# Patient Record
Sex: MALE | Race: WHITE
[De-identification: names, ages, dates, MRNs, and addresses within clinical notes are randomized per-mention and may not be internally consistent; named-entity substitution may affect disease eponyms.]

---

## 2019-02-26 ENCOUNTER — HOSPITAL ENCOUNTER (EMERGENCY)
Dept: HOSPITAL 97 - ER | Age: 29
Discharge: HOME | End: 2019-02-26
Payer: SELF-PAY

## 2019-02-26 DIAGNOSIS — Z91.018: ICD-10-CM

## 2019-02-26 DIAGNOSIS — Z72.0: ICD-10-CM

## 2019-02-26 DIAGNOSIS — R04.0: Primary | ICD-10-CM

## 2019-02-26 PROCEDURE — 99282 EMERGENCY DEPT VISIT SF MDM: CPT

## 2019-02-26 NOTE — ER
Nurse's Notes                                                                                     

 [facility]                                                                

Name: Haresh Britt                                                                         

Age: 29 yrs                                                                                       

Sex: Male                                                                                         

: 1990                                                                                   

MRN: [id number]                                                                                   

Arrival Date: 2019                                                                          

Time: 13:47                                                                                       

Account#: [id number]                                                                            

Bed 5                                                                                             

Private MD: None, None                                                                            

Diagnosis: Epistaxis                                                                              

                                                                                                  

Presentation:                                                                                     

                                                                                             

13:56 Presenting complaint: Patient states: Frequent nose bleeds x 2 -3 days, reports recent  ph  

      cold-like symptoms, c/o intermittent bleeding from R nare lasting approx 5-6 min, no        

      bleeding noted at this time. Transition of care: patient was not received from another      

      setting of care. Onset of symptoms was 2019. Risk Assessment: Do you want      

      to hurt yourself or someone else? Patient reports no desire to harm self or others.         

      Initial Sepsis Screen: Does the patient meet any 2 criteria? No. Patient's initial          

      sepsis screen is negative. Does the patient have a suspected source of infection? No.       

      Patient's initial sepsis screen is negative. Care prior to arrival: None.                   

13:56 Method Of Arrival: Ambulatory                                                           ph  

13:56 Acuity: ALEJANDRA 4                                                                           ph  

                                                                                                  

Historical:                                                                                       

- Allergies:                                                                                      

13:58 ORANGES;                                                                                ph  

- Home Meds:                                                                                      

13:58 None [Active];                                                                          ph  

- PMHx:                                                                                           

13:58 None;                                                                                   ph  

- PSHx:                                                                                           

13:58 None;                                                                                   ph  

                                                                                                  

- Immunization history:: Adult Immunizations unknown.                                             

- Social history:: Smoking status: Patient uses tobacco products, denies chronic                  

  smoking, but will smoke occasionally.                                                           

- Ebola Screening: : No symptoms or risks identified at this time.                                

- Family history:: not pertinent.                                                                 

- Hospitalizations: : No recent hospitalization is reported.                                      

                                                                                                  

                                                                                                  

Screenin:53 Abuse screen: Denies threats or abuse. Denies injuries from another. Nutritional        sv  

      screening: No deficits noted. Tuberculosis screening: No symptoms or risk factors           

      identified. Fall Risk None identified.                                                      

                                                                                                  

Assessment:                                                                                       

14:00 General: Appears in no apparent distress. comfortable, Behavior is calm, cooperative,   sv  

      appropriate for age. Pain: Denies pain. Neuro: Level of Consciousness is awake, alert,      

      obeys commands, Oriented to person, place, time, situation, Gait is steady.                 

      Respiratory: Respiratory effort is even, unlabored, Respiratory pattern is regular,         

      symmetrical. Derm: Skin is pink, warm \T\ dry.                                              

                                                                                                  

Vital Signs:                                                                                      

13:59  / 57; Pulse 92; Resp 18; Temp 97.9; Pulse Ox 100% on R/A; Weight 99.79 kg;       ph  

      Height 6 ft. 2 in. (187.96 cm);                                                             

13:59 Body Mass Index 28.25 (99.79 kg, 187.96 cm)                                             ph  

                                                                                                  

ED Course:                                                                                        

13:47 Patient arrived in ED.                                                                  mr  

13:47 None, None is Private Physician.                                                        mr  

13:52 Alejandra Moody, RN is Primary Nurse.                                                  sv  

13:53 Patient has correct armband on for positive identification. Bed in low position. Door   sv  

      closed. Head of bed elevated.                                                               

13:55 Reji Puente MD is Attending Physician.                                                rn  

13:58 Triage completed.                                                                       ph  

14:00 Arm band placed on.                                                                     sv  

14:14 No provider procedures requiring assistance completed. Patient did not have IV access   sv  

      during this emergency room visit.                                                           

                                                                                                  

Administered Medications:                                                                         

No medications were administered                                                                  

                                                                                                  

                                                                                                  

Outcome:                                                                                          

14:11 Discharge ordered by MD.                                                                rn  

14:14 Discharged to home ambulatory.                                                          sv  

14:14 Condition: stable                                                                           

14:14 Discharge instructions given to patient, Instructed on discharge instructions, follow       

      up and referral plans. Demonstrated understanding of instructions, follow-up care.          

14:15 Patient left the ED.                                                                    sv  

                                                                                                  

Signatures:                                                                                       

Alejandra Moody, RN                    PARIS                                                      

YarbroughAdriana                                 mr                                                   

Reji Puente MD MD rn                                                   

AshevilleKarma RN                      RN                                                      

                                                                                                  

**************************************************************************************************

## 2019-02-26 NOTE — EDPHYS
Physician Documentation                                                                           

 [facility]                                                                

Name: Haresh Britt                                                                         

Age: 29 yrs                                                                                       

Sex: Male                                                                                         

: 1990                                                                                   

MRN: [id number]                                                                                   

Arrival Date: 2019                                                                          

Time: 13:47                                                                                       

Account#: [id number]                                                                            

Bed 5                                                                                             

Private MD: None, None                                                                            

ED Physician Reji Puente                                                                         

HPI:                                                                                              

                                                                                             

14:06 This 29 yrs old  Male presents to ER via Ambulatory with complaints of Nose    rn  

      Bleed.                                                                                      

14:06 The patient presents with a nose bleed, and the bleeding resolved prior to arrival.     rn  

      Onset: The symptoms/episode began/occurred 1 week(s) ago. Modifying factors: The            

      symptoms are alleviated by pressure, the symptoms are aggravated by blowing nose.           

      Severity of symptoms: At their worst the symptoms were mild in the emergency department     

      the symptoms have resolved. The patient has experienced similar episodes in the past.       

      Reports cold recently, has been blowing his nose a lot and has hx of frequent nose          

      bleeds, stops with pressure, no dizziness, no blood thinners, and no trauma..               

                                                                                                  

Historical:                                                                                       

- Allergies:                                                                                      

13:58 ORANGES;                                                                                ph  

- Home Meds:                                                                                      

13:58 None [Active];                                                                          ph  

- PMHx:                                                                                           

13:58 None;                                                                                   ph  

- PSHx:                                                                                           

13:58 None;                                                                                   ph  

                                                                                                  

- Immunization history:: Adult Immunizations unknown.                                             

- Social history:: Smoking status: Patient uses tobacco products, denies chronic                  

  smoking, but will smoke occasionally.                                                           

- Ebola Screening: : No symptoms or risks identified at this time.                                

- Family history:: not pertinent.                                                                 

- Hospitalizations: : No recent hospitalization is reported.                                      

                                                                                                  

                                                                                                  

ROS:                                                                                              

14:06 Constitutional: Negative for fever, chills, and weight loss, ENT: + nose bleed Skin:    rn  

      Negative for injury, rash, and discoloration, Neuro: Negative for headache, weakness,       

      numbness, tingling, and seizure.                                                            

                                                                                                  

Exam:                                                                                             

14:06 Constitutional:  This is a well developed, well nourished patient who is awake, alert,  rn  

      and in no acute distress. Head/Face:  Normocephalic, atraumatic. ENT:  dry nasal blood      

      right nare without active bleeding, appears likely proximal bleed that has resolved.        

                                                                                                  

Vital Signs:                                                                                      

13:59  / 57; Pulse 92; Resp 18; Temp 97.9; Pulse Ox 100% on R/A; Weight 99.79 kg;       ph  

      Height 6 ft. 2 in. (187.96 cm);                                                             

13:59 Body Mass Index 28.25 (99.79 kg, 187.96 cm)                                             ph  

                                                                                                  

MDM:                                                                                              

13:55 Patient medically screened.                                                             rn  

14:06 Differential diagnosis: spontaneous epistaxis. Data reviewed: vital signs, nurses       rn  

      notes, and as a result, I will discharge patient. Counseling: I had a detailed              

      discussion with the patient and/or guardian regarding: the historical points, exam          

      findings, and any diagnostic results supporting the discharge/admit diagnosis, the need     

      for outpatient follow up, to return to the emergency department if symptoms worsen or       

      persist or if there are any questions or concerns that arise at home. Special               

      discussion: Based on the history and exam findings, there is no indication for further      

      emergent testing or inpatient evaluation. I discussed with the patient/guardian the         

      need to see the ENT specialist for further evaluation of the symptoms. ED course:           

      Bleeding resolved, advised nasal saline/nasacort, and ENT f/u. Given nasal clamps,          

      directed to hold pressure and lean forward. .                                               

                                                                                                  

Administered Medications:                                                                         

No medications were administered                                                                  

                                                                                                  

                                                                                                  

Disposition:                                                                                      

19 14:11 Discharged to Home. Impression: Epistaxis.                                         

- Condition is Stable.                                                                            

- Discharge Instructions: Nosebleed, Adult.                                                       

                                                                                                  

- Medication Reconciliation Form, Thank You Letter, Antibiotic Education, Prescription            

  Opioid Use form.                                                                                

- Follow up: Private Physician; When: As needed; Reason: Recheck today's complaints,              

  Re-evaluation by your physician.                                                                

- Problem is new.                                                                                 

- Symptoms have improved.                                                                         

                                                                                                  

                                                                                                  

                                                                                                  

Signatures:                                                                                       

Alejandra Moody RN RN                                                      

Reji Puente MD MD rn Hall, Patricia, RN                      RN                                                      

                                                                                                  

Corrections: (The following items were deleted from the chart)                                    

14:15 14:11 2019 14:11 Discharged to Home. Impression: Epistaxis. Condition is Stable.  sv  

      Forms are Medication Reconciliation Form, Thank You Letter, Antibiotic Education,           

      Prescription Opioid Use. Follow up: Private Physician; When: As needed; Reason: Recheck     

      today's complaints, Re-evaluation by your physician. Problem is new. Symptoms have          

      improved. rn                                                                                

                                                                                                  

**************************************************************************************************